# Patient Record
Sex: MALE | Race: BLACK OR AFRICAN AMERICAN | NOT HISPANIC OR LATINO | Employment: UNEMPLOYED | URBAN - METROPOLITAN AREA
[De-identification: names, ages, dates, MRNs, and addresses within clinical notes are randomized per-mention and may not be internally consistent; named-entity substitution may affect disease eponyms.]

---

## 2017-12-30 ENCOUNTER — HOSPITAL ENCOUNTER (EMERGENCY)
Facility: MEDICAL CENTER | Age: 27
End: 2017-12-30
Attending: EMERGENCY MEDICINE
Payer: MEDICAID

## 2017-12-30 VITALS
HEART RATE: 73 BPM | HEIGHT: 69 IN | RESPIRATION RATE: 15 BRPM | OXYGEN SATURATION: 98 % | WEIGHT: 179.9 LBS | BODY MASS INDEX: 26.64 KG/M2 | SYSTOLIC BLOOD PRESSURE: 125 MMHG | DIASTOLIC BLOOD PRESSURE: 83 MMHG | TEMPERATURE: 99.5 F

## 2017-12-30 DIAGNOSIS — L98.9 SKIN LESION: ICD-10-CM

## 2017-12-30 PROCEDURE — 99283 EMERGENCY DEPT VISIT LOW MDM: CPT

## 2017-12-30 ASSESSMENT — PAIN SCALES - GENERAL: PAINLEVEL_OUTOF10: 4

## 2017-12-31 NOTE — ED PROVIDER NOTES
"ED Provider Note    Scribed for Cl Oakley M.D. by Negro Pena. 12/30/2017  5:02 PM    Primary care provider: Pcp Pt States None  Means of arrival: Private vehicle  History obtained from: Patient  History limited by: None    CHIEF COMPLAINT  Chief Complaint   Patient presents with   • Ankle Pain     Right ankle since tuesday. denies injury or illness. pt unable to work yesterday and today.       HPI  Ant Barrera is a 27 y.o. smoker who presents to the Emergency Department for sudden right ankle pain onset 5 days ago on Tuesday. The patient denies any injuries or trauma to the ankle to have induced symptoms. 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 He denies any focal numbness or weakness.    REVIEW OF SYSTEMS  Pertinent negatives include no focal numbness or weakness.  E.      PAST MEDICAL HISTORY  The patient has no chronic medical history.    SURGICAL HISTORY  patient denies any surgical history    SOCIAL HISTORY  Social History   Substance Use Topics   • Smoking status: Current Every Day Smoker     Packs/day: 0.50     Types: Cigarettes      Comment: since 16   • Alcohol use No      Comment: occ      History   Drug Use     Comment: \"weed\"       FAMILY HISTORY  No family history noted     CURRENT MEDICATIONS  No current facility-administered medications for this encounter.     Current Outpatient Prescriptions:   •  hydrocodone-acetaminophen (NORCO) 5-325 MG Tab per tablet, Take 1-2 Tabs by mouth every 6 hours as needed., Disp: 24 Tab, Rfl: 0    ALLERGIES  No Known Allergies    PHYSICAL EXAM  VITAL SIGNS: /81   Pulse 85   Temp 37.5 °C (99.5 °F) (Temporal)   Resp 14   Ht 1.753 m (5' 9\")   Wt 81.6 kg (179 lb 14.3 oz)   SpO2 97%   BMI 26.57 kg/m²     Constitutional: Well developed, Well nourished, No acute distress, Non-toxic appearance.   Extremities: Small amount of swelling above right lateral malleolus without erythema "      COURSE & MEDICAL DECISION MAKING  Nursing notes, VS, PMSFHx reviewed in chart.    5:11 PM Patient seen and examined at bedside. Patient appears to have a small irritation of the skin which is causing him pain and swelling. The nares keep a bandage over this area to protect it.     Patient has had high blood pressure while in the emergency department, felt likely secondary to medical condition. Counseled patient to monitor blood pressure at home and follow up with primary care physician.     I have signed into and reviewed the patient's prescription monitoring program data prior to prescribing a scheduled drug. The patient will not drink alcohol nor drive with prescribed medications. The patient will return for new or worsening symptoms and is stable at the time of discharge.    DISPOSITION:  Patient will be discharged home in stable condition.    FINAL IMPRESSION  1. Skin lesion          Negro ZELAYA (Noreenibe), am scribing for, and in the presence of, Cl Oakley M.D..    Electronically signed by: Negro Pena (Edie), 12/30/2017    Cl ZELAYA M.D. personally performed the services described in this documentation, as scribed by Negro Pena in my presence, and it is both accurate and complete.    The note accurately reflects work and decisions made by me.  Cl Oakley  12/30/2017  5:27 PM

## 2017-12-31 NOTE — ED NOTES
Pt verbalized understanding of DC instructions, pt with no further questions. He has been given additional band aids and work note for today. Pt ambulate out of ED with steady gait.

## 2017-12-31 NOTE — DISCHARGE INSTRUCTIONS
Please keep a dressing over this irritated area which is swollen and painful as a result of your boots

## 2017-12-31 NOTE — ED NOTES
"Chief Complaint   Patient presents with   • Ankle Pain     Right ankle since tuesday. denies injury or illness. pt unable to work yesterday and today.     Pt ambulatory to triage with steady gait.   Blood pressure 125/81, pulse 85, temperature 37.5 °C (99.5 °F), temperature source Temporal, resp. rate 14, height 1.753 m (5' 9\"), weight 81.6 kg (179 lb 14.3 oz), SpO2 97 %.    Pt informed of wait times. Educated on triage process.  Asked to return to triage RN for any new or worsening of symptoms. Thanked for patience.        "

## 2024-06-07 ENCOUNTER — HOSPITAL ENCOUNTER (EMERGENCY)
Facility: MEDICAL CENTER | Age: 34
End: 2024-06-07
Attending: EMERGENCY MEDICINE
Payer: COMMERCIAL

## 2024-06-07 ENCOUNTER — PHARMACY VISIT (OUTPATIENT)
Dept: PHARMACY | Facility: MEDICAL CENTER | Age: 34
End: 2024-06-07
Payer: MEDICARE

## 2024-06-07 VITALS
DIASTOLIC BLOOD PRESSURE: 111 MMHG | SYSTOLIC BLOOD PRESSURE: 151 MMHG | TEMPERATURE: 97.7 F | BODY MASS INDEX: 21.72 KG/M2 | RESPIRATION RATE: 18 BRPM | HEIGHT: 68 IN | WEIGHT: 143.3 LBS | OXYGEN SATURATION: 94 % | HEART RATE: 95 BPM

## 2024-06-07 DIAGNOSIS — R03.0 ELEVATED BLOOD PRESSURE READING: ICD-10-CM

## 2024-06-07 DIAGNOSIS — R11.11 VOMITING WITHOUT NAUSEA, UNSPECIFIED VOMITING TYPE: ICD-10-CM

## 2024-06-07 DIAGNOSIS — K05.10 GINGIVITIS: ICD-10-CM

## 2024-06-07 LAB
ALBUMIN SERPL BCP-MCNC: 4.4 G/DL (ref 3.2–4.9)
ALBUMIN/GLOB SERPL: 1.1 G/DL
ALP SERPL-CCNC: 154 U/L (ref 30–99)
ALT SERPL-CCNC: 59 U/L (ref 2–50)
ANION GAP SERPL CALC-SCNC: 20 MMOL/L (ref 7–16)
AST SERPL-CCNC: 128 U/L (ref 12–45)
BASOPHILS # BLD AUTO: 0.5 % (ref 0–1.8)
BASOPHILS # BLD: 0.04 K/UL (ref 0–0.12)
BILIRUB SERPL-MCNC: 1.4 MG/DL (ref 0.1–1.5)
BUN SERPL-MCNC: 11 MG/DL (ref 8–22)
CALCIUM ALBUM COR SERPL-MCNC: 9.2 MG/DL (ref 8.5–10.5)
CALCIUM SERPL-MCNC: 9.5 MG/DL (ref 8.5–10.5)
CHLORIDE SERPL-SCNC: 98 MMOL/L (ref 96–112)
CO2 SERPL-SCNC: 22 MMOL/L (ref 20–33)
CREAT SERPL-MCNC: 1.14 MG/DL (ref 0.5–1.4)
EOSINOPHIL # BLD AUTO: 0.03 K/UL (ref 0–0.51)
EOSINOPHIL NFR BLD: 0.3 % (ref 0–6.9)
ERYTHROCYTE [DISTWIDTH] IN BLOOD BY AUTOMATED COUNT: 48.4 FL (ref 35.9–50)
GFR SERPLBLD CREATININE-BSD FMLA CKD-EPI: 87 ML/MIN/1.73 M 2
GLOBULIN SER CALC-MCNC: 3.9 G/DL (ref 1.9–3.5)
GLUCOSE SERPL-MCNC: 103 MG/DL (ref 65–99)
HCT VFR BLD AUTO: 48.9 % (ref 42–52)
HGB BLD-MCNC: 17.3 G/DL (ref 14–18)
IMM GRANULOCYTES # BLD AUTO: 0.03 K/UL (ref 0–0.11)
IMM GRANULOCYTES NFR BLD AUTO: 0.3 % (ref 0–0.9)
LIPASE SERPL-CCNC: 26 U/L (ref 11–82)
LYMPHOCYTES # BLD AUTO: 1.81 K/UL (ref 1–4.8)
LYMPHOCYTES NFR BLD: 20.8 % (ref 22–41)
MCH RBC QN AUTO: 32.6 PG (ref 27–33)
MCHC RBC AUTO-ENTMCNC: 35.4 G/DL (ref 32.3–36.5)
MCV RBC AUTO: 92.3 FL (ref 81.4–97.8)
MONOCYTES # BLD AUTO: 0.93 K/UL (ref 0–0.85)
MONOCYTES NFR BLD AUTO: 10.7 % (ref 0–13.4)
NEUTROPHILS # BLD AUTO: 5.86 K/UL (ref 1.82–7.42)
NEUTROPHILS NFR BLD: 67.4 % (ref 44–72)
NRBC # BLD AUTO: 0 K/UL
NRBC BLD-RTO: 0 /100 WBC (ref 0–0.2)
PLATELET # BLD AUTO: 160 K/UL (ref 164–446)
PMV BLD AUTO: 10.4 FL (ref 9–12.9)
POTASSIUM SERPL-SCNC: 3.8 MMOL/L (ref 3.6–5.5)
PROT SERPL-MCNC: 8.3 G/DL (ref 6–8.2)
RBC # BLD AUTO: 5.3 M/UL (ref 4.7–6.1)
SODIUM SERPL-SCNC: 140 MMOL/L (ref 135–145)
WBC # BLD AUTO: 8.7 K/UL (ref 4.8–10.8)

## 2024-06-07 PROCEDURE — 36415 COLL VENOUS BLD VENIPUNCTURE: CPT

## 2024-06-07 PROCEDURE — A9270 NON-COVERED ITEM OR SERVICE: HCPCS | Mod: UD | Performed by: EMERGENCY MEDICINE

## 2024-06-07 PROCEDURE — 700111 HCHG RX REV CODE 636 W/ 250 OVERRIDE (IP): Mod: UD | Performed by: EMERGENCY MEDICINE

## 2024-06-07 PROCEDURE — 99284 EMERGENCY DEPT VISIT MOD MDM: CPT

## 2024-06-07 PROCEDURE — 85025 COMPLETE CBC W/AUTO DIFF WBC: CPT

## 2024-06-07 PROCEDURE — 83690 ASSAY OF LIPASE: CPT

## 2024-06-07 PROCEDURE — 700102 HCHG RX REV CODE 250 W/ 637 OVERRIDE(OP): Mod: UD | Performed by: EMERGENCY MEDICINE

## 2024-06-07 PROCEDURE — 80053 COMPREHEN METABOLIC PANEL: CPT

## 2024-06-07 PROCEDURE — RXMED WILLOW AMBULATORY MEDICATION CHARGE: Performed by: EMERGENCY MEDICINE

## 2024-06-07 RX ORDER — ONDANSETRON 4 MG/1
4 TABLET, ORALLY DISINTEGRATING ORAL EVERY 6 HOURS PRN
Qty: 10 TABLET | Refills: 0 | Status: SHIPPED | OUTPATIENT
Start: 2024-06-07

## 2024-06-07 RX ORDER — AMOXICILLIN 500 MG/1
500 CAPSULE ORAL 3 TIMES DAILY
Qty: 30 CAPSULE | Refills: 0 | Status: ACTIVE | OUTPATIENT
Start: 2024-06-07

## 2024-06-07 RX ORDER — AMOXICILLIN 500 MG/1
500 CAPSULE ORAL ONCE
Status: COMPLETED | OUTPATIENT
Start: 2024-06-07 | End: 2024-06-07

## 2024-06-07 RX ORDER — ONDANSETRON 4 MG/1
4 TABLET, ORALLY DISINTEGRATING ORAL ONCE
Status: COMPLETED | OUTPATIENT
Start: 2024-06-07 | End: 2024-06-07

## 2024-06-07 RX ADMIN — ONDANSETRON 4 MG: 4 TABLET, ORALLY DISINTEGRATING ORAL at 10:58

## 2024-06-07 RX ADMIN — AMOXICILLIN 500 MG: 500 CAPSULE ORAL at 10:58

## 2024-06-07 ASSESSMENT — LIFESTYLE VARIABLES: DO YOU DRINK ALCOHOL: NO

## 2024-06-07 ASSESSMENT — PAIN DESCRIPTION - PAIN TYPE: TYPE: ACUTE PAIN

## 2024-06-07 NOTE — DISCHARGE INSTRUCTIONS
We also noted your blood pressure to be elevated.  This may be secondary to pain.    We recommend following up with another blood pressure check.  This can be done at the dentist office as well will for follow-up

## 2024-06-07 NOTE — ED TRIAGE NOTES
.  Chief Complaint   Patient presents with    Mouth Pain     Since Wednesday    N/V     Since yesterday      Pt BIB EMS to triage for above complaint.   Pt educated on triage process and returned to lobby.

## 2024-06-07 NOTE — ED PROVIDER NOTES
"  CHIEF COMPLAINT  Chief Complaint   Patient presents with    Mouth Pain     Since Wednesday    N/V     Since yesterday       LIMITATION TO HISTORY   None    HPI    Ant Barrera is a 33 y.o. male comes in with mouth pain.  Since Wednesday.  Bilateral upper and lower.  Inflamed.  Not alleviated with Tylenol or Motrin.  Associate with vomiting since Thursday night.  No associated fever chills no significant significant mouth swelling or difficulty swallowing no difficulty breathing.    Patient has not seen a dentist in quite some time    Patient has no history of prior dental history except for removal of wisdom teeth.    OUTSIDE HISTORIAN(S):  None.    EXTERNAL RECORDS REVIEWED  Seen in the ER last time 2017 for some fractures and strains    REVIEW OF SYSTEMS  See above    PAST MEDICAL HISTORY  History reviewed. No pertinent past medical history.    FAMILY HISTORY  History reviewed. No pertinent family history.    SOCIAL HISTORY  Social History     Tobacco Use    Smoking status: Every Day     Current packs/day: 0.50     Types: Cigarettes    Tobacco comments:     since 16   Substance Use Topics    Alcohol use: No     Comment: occ    Drug use: Yes     Comment: \"weed\"     Social History     Substance and Sexual Activity   Drug Use Yes    Comment: \"weed\"       SURGICAL HISTORY  History reviewed. No pertinent surgical history.    CURRENT MEDICATIONS  No current facility-administered medications for this encounter.    Current Outpatient Medications:     hydrocodone-acetaminophen (NORCO) 5-325 MG Tab per tablet, Take 1-2 Tabs by mouth every 6 hours as needed., Disp: 24 Tab, Rfl: 0    ALLERGIES  No Known Allergies    PHYSICAL EXAM  VITAL SIGNS: BP (!) 160/112   Pulse (!) 105   Temp 36.1 °C (96.9 °F) (Temporal)   Resp 16   Ht 1.727 m (5' 8\")   Wt 65 kg (143 lb 4.8 oz)   SpO2 96%   BMI 21.79 kg/m²   Reviewed and noted  Constitutional: Well developed, Well nourished, no acute distress.  HENT: Normocephalic, atraumatic, " bilateral external ears normal, No intraoral erythema, edema, exudate.  The gingiva are all swollen.  There is no discrete abscess.  No fluctuance at all tender.  No uvular shift no peritonsillar swelling or no stridor.  Eyes: PERRLA, conjunctiva pink, no scleral icterus.   Cardiovascular: Light tachycardia noted  Respiratory: No respiratory distress no stridor.   Neurologic: Alert, no facial droop noted. All extra ocular muscles intact. Moves all extremities with out weakness noted  Psychiatric: Affect normal, Judgment normal, Mood normal.         MEDICAL DECISION MAKING:  PROBLEMS EVALUATED THIS VISIT:  Gingivitis.  No signs of airway involvement.  Patient started dental pain3 then 2 days then vomiting.  He looks well nontoxic.  There is slight tachycardia slight hypertension.  No fever.  On exam patient has no signs of airway involvement.  Swollen gingiva throughout no discrete swelling noted.  Slight tachycardia noted slight elevated blood pressure as well.  He is not hypoxic.         PLAN:  Oxacillin  Zofran  Dental referral  Information regarding elevated blood pressure    RISK:  Moderate risk require prescription for antibiotics and nausea medicine        RESULTS    LABS Ordered and Reviewed by Me:          ED COURSE:    ED Observation Status? No   No noted need for observation for developing issue    INTERVENTIONS BY ME:  Medications   ondansetron (Zofran ODT) dispertab 4 mg (has no administration in time range)   amoxicillin (Amoxil) capsule 500 mg (has no administration in time range)           CONSULTANTS/OTHER GROUPS CONTACTED    Dental referral sheet ordered    FINAL DISPO PLAN   New Prescriptions    AMOXICILLIN (AMOXIL) 500 MG CAP    Take 1 Capsule by mouth 3 times a day.    ONDANSETRON (ZOFRAN ODT) 4 MG TABLET DISPERSIBLE    Take 1 Tablet by mouth every 6 hours as needed for Nausea/Vomiting.         Followup:  [facility], Emergency Dept  [address] [facility]  [address]  [phone]  Go to   If symptoms worsen      CONDITION: See above.     FINAL IMPRESSION  1. Gingivitis    2. Vomiting without nausea, unspecified vomiting type    3. Elevated blood pressure reading

## 2024-09-04 ENCOUNTER — APPOINTMENT (OUTPATIENT)
Dept: RADIOLOGY | Facility: MEDICAL CENTER | Age: 34
End: 2024-09-04
Attending: STUDENT IN AN ORGANIZED HEALTH CARE EDUCATION/TRAINING PROGRAM
Payer: COMMERCIAL

## 2024-09-04 ENCOUNTER — HOSPITAL ENCOUNTER (EMERGENCY)
Facility: MEDICAL CENTER | Age: 34
End: 2024-09-04
Attending: STUDENT IN AN ORGANIZED HEALTH CARE EDUCATION/TRAINING PROGRAM
Payer: COMMERCIAL

## 2024-09-04 ENCOUNTER — PHARMACY VISIT (OUTPATIENT)
Dept: PHARMACY | Facility: MEDICAL CENTER | Age: 34
End: 2024-09-04
Payer: COMMERCIAL

## 2024-09-04 VITALS
OXYGEN SATURATION: 94 % | TEMPERATURE: 98.2 F | HEART RATE: 75 BPM | BODY MASS INDEX: 22.02 KG/M2 | SYSTOLIC BLOOD PRESSURE: 118 MMHG | WEIGHT: 145.28 LBS | HEIGHT: 68 IN | RESPIRATION RATE: 18 BRPM | DIASTOLIC BLOOD PRESSURE: 70 MMHG

## 2024-09-04 DIAGNOSIS — S22.42XA CLOSED FRACTURE OF MULTIPLE RIBS OF LEFT SIDE, INITIAL ENCOUNTER: ICD-10-CM

## 2024-09-04 DIAGNOSIS — F10.920 ALCOHOLIC INTOXICATION WITHOUT COMPLICATION (HCC): ICD-10-CM

## 2024-09-04 PROCEDURE — 99284 EMERGENCY DEPT VISIT MOD MDM: CPT

## 2024-09-04 PROCEDURE — 700101 HCHG RX REV CODE 250: Mod: UD | Performed by: STUDENT IN AN ORGANIZED HEALTH CARE EDUCATION/TRAINING PROGRAM

## 2024-09-04 PROCEDURE — RXMED WILLOW AMBULATORY MEDICATION CHARGE: Performed by: STUDENT IN AN ORGANIZED HEALTH CARE EDUCATION/TRAINING PROGRAM

## 2024-09-04 PROCEDURE — 71101 X-RAY EXAM UNILAT RIBS/CHEST: CPT | Mod: LT

## 2024-09-04 PROCEDURE — A9270 NON-COVERED ITEM OR SERVICE: HCPCS | Mod: UD | Performed by: STUDENT IN AN ORGANIZED HEALTH CARE EDUCATION/TRAINING PROGRAM

## 2024-09-04 PROCEDURE — 700102 HCHG RX REV CODE 250 W/ 637 OVERRIDE(OP): Mod: UD | Performed by: STUDENT IN AN ORGANIZED HEALTH CARE EDUCATION/TRAINING PROGRAM

## 2024-09-04 RX ORDER — LIDOCAINE 4 G/G
1 PATCH TOPICAL EVERY 24 HOURS
Status: DISCONTINUED | OUTPATIENT
Start: 2024-09-04 | End: 2024-09-04 | Stop reason: HOSPADM

## 2024-09-04 RX ORDER — LIDOCAINE 50 MG/G
1 PATCH TOPICAL EVERY 24 HOURS
Qty: 10 PATCH | Refills: 0 | Status: SHIPPED | OUTPATIENT
Start: 2024-09-04 | End: 2024-09-04

## 2024-09-04 RX ORDER — LIDOCAINE 4 G/G
1 PATCH TOPICAL EVERY 24 HOURS
Qty: 7 PATCH | Refills: 0 | Status: SHIPPED | OUTPATIENT
Start: 2024-09-04 | End: 2024-09-11

## 2024-09-04 RX ORDER — ACETAMINOPHEN 325 MG/1
650 TABLET ORAL ONCE
Status: COMPLETED | OUTPATIENT
Start: 2024-09-04 | End: 2024-09-04

## 2024-09-04 RX ADMIN — ACETAMINOPHEN 650 MG: 325 TABLET ORAL at 04:54

## 2024-09-04 RX ADMIN — LIDOCAINE 1 PATCH: 4 PATCH TOPICAL at 02:34

## 2024-09-04 ASSESSMENT — FIBROSIS 4 INDEX: FIB4 SCORE: 3.54

## 2024-09-04 NOTE — ED TRIAGE NOTES
"  Chief Complaint   Patient presents with    Rib Pain     Pt report L rib pain, 5/10 x5days. -trauma/injury    Alcohol Intoxication     Pt admits to drinking alcohol everyday, last drink was prior to arrival 1/2 pints of vodka.        Pt ambulatory to triage. Pt A&Ox4, for above complaint.     Pt to lobby. Pt educated on alerting staff in changes to condition. Pt verbalized understanding.     BP (!) 153/100   Pulse 90   Temp 36.6 °C (97.9 °F) (Temporal)   Resp 16   Ht 1.727 m (5' 8\")   Wt 65.9 kg (145 lb 4.5 oz)   SpO2 96%   BMI 22.09 kg/m²    "

## 2024-09-04 NOTE — ED PROVIDER NOTES
"ED Provider Note    CHIEF COMPLAINT  Chief Complaint   Patient presents with    Rib Pain     Pt report L rib pain, 5/10 x5days. -trauma/injury    Alcohol Intoxication     Pt admits to drinking alcohol everyday, last drink was prior to arrival 1/2 pints of vodka.        EXTERNAL RECORDS REVIEWED  External ED Note ER visit on 4/7/2016 for muscle strain    HPI/ROS  LIMITATION TO HISTORY   Select: Intoxication  OUTSIDE HISTORIAN(S):    Ant Barrera is a 34 y.o. male who presents with left-sided rib pain for 5 days.  Patient does not remember if he injured himself.  Patient also reports to drinking alcohol daily.  Patient denies difficulty breathing or swallowing.  Patient denies head trauma or neck pain.    PAST MEDICAL HISTORY       SURGICAL HISTORY  patient denies any surgical history    FAMILY HISTORY  History reviewed. No pertinent family history.    SOCIAL HISTORY  Social History     Tobacco Use    Smoking status: Every Day     Current packs/day: 0.50     Types: Cigarettes    Smokeless tobacco: Not on file    Tobacco comments:     since 16   Substance and Sexual Activity    Alcohol use: Yes    Drug use: Yes     Comment: \"weed\"    Sexual activity: Not on file       CURRENT MEDICATIONS  Home Medications       Reviewed by Phillip Guaman R.N. (Registered Nurse) on 09/04/24 at 0116  Med List Status: Not Addressed     Medication Last Dose Status   amoxicillin (AMOXIL) 500 MG Cap  Active   hydrocodone-acetaminophen (NORCO) 5-325 MG Tab per tablet  Active   ondansetron (ZOFRAN ODT) 4 MG TABLET DISPERSIBLE  Active                  Audit from Redirected Encounters    **Home medications have not yet been reviewed for this encounter**         ALLERGIES  No Known Allergies    PHYSICAL EXAM  VITAL SIGNS: /88   Pulse 78   Temp 36.7 °C (98.1 °F) (Temporal)   Resp 14   Ht 1.727 m (5' 8\")   Wt 65.9 kg (145 lb 4.5 oz)   SpO2 94%   BMI 22.09 kg/m²    Vitals and nursing note reviewed.   Constitutional:       Comments: " Patient is lying in bed supine, pleasant, conversant, slurred speech  HENT:      Head: Normocephalic and atraumatic.   Eyes:      Extraocular Movements: Extraocular movements intact.      Conjunctiva/sclera: Conjunctivae injection.      Pupils: Pupils are equal, round, and reactive to light.   Cardiovascular:      Pulses: Normal pulses.      Comments: HR 90  Pulmonary:      Effort: Pulmonary effort is normal. No respiratory distress.   Musculoskeletal:      Left-sided chest wall tenderness to palpation without crepitus     General: No swelling. Normal range of motion.      Cervical back: Normal range of motion. No rigidity.   Skin:     General: Skin is warm and dry.      Capillary Refill: Capillary refill takes less than 2 seconds.   Neurological:      Mental Status: Alert.         RADIOLOGY/PROCEDURES   I have independently interpreted the diagnostic imaging associated with this visit and am waiting the final reading from the radiologist.   My preliminary interpretation is as follows: 6 to ninth rib fractures on the left    Radiologist interpretation:  [id number] (WITH 1-VIEW CXR) LEFT   Final Result         1.  Left lateral sixth through ninth rib fractures          COURSE & MEDICAL DECISION MAKING    ASSESSMENT, COURSE AND PLAN  Care Narrative: X-ray to evaluate for fracture versus dislocation of the ribs, pneumothorax, pulmonary contusion.  Lidocaine patch given for symptom control.  No evidence of head trauma necessitating brain imaging, no evidence of cervical spine trauma necessitating imaging.  Patient appears intoxicated, does have vodka on hand.    Electronically signed by: Yannick Gibbons M.D., 9/4/2024 2:26 AM    Dr. Borjas consulted for rib fractures 6 through 9.  Since the patient is 5 days out, not tachypneic, not hypoxemic, pain is well-controlled he believes patient is stable for outpatient follow-up.  Patient given prescription for pain control, topical and oral.  Patient has been  given incentive spirometer as well to mitigate pneumonia risk.  Patient is clinically sober at this time, ambulating without assistance.    Repeat physical exam benign.  I doubt any serious emergency process at this time.  Patient and/or family, friends given strict return precautions for worsening symptoms and care instructions. They have demonstrated understanding of discharge instructions through teach back mechanism. Advised PCP follow-up in 1-2 days.  Patient/family/friend expresses understanding and agrees to plan.    This dictation has been created using voice recognition software. I am continuously working with the software to minimize the number of voice recognition errors and I have made every attempt to manually correct the errors within my dictation. However errors  related to this voice recognition software may still exist and should be interpreted within the appropriate context.     Electronically signed by: Yannick Gibbons M.D., 9/4/2024 4:10 AM      DISPOSITION AND DISCUSSIONS    Escalation of care considered, and ultimately not performed:after discussion with the patient / family, they have elected to decline an escalation in care    Barriers to care at this time, including but not limited to: Patient is homeless.     Decision tools and prescription drugs considered including, but not limited to: Pain Medications   over-the-counter pain medications are appropriate, narcotics not indicated at this time  .    FINAL DIAGNOSIS  1. Alcoholic intoxication without complication (HCC)    2. Closed fracture of multiple ribs of left side, initial encounter         Electronically signed by: Yannick Gibbons M.D., 9/4/2024 2:22 AM